# Patient Record
Sex: FEMALE | Race: OTHER | NOT HISPANIC OR LATINO | URBAN - METROPOLITAN AREA
[De-identification: names, ages, dates, MRNs, and addresses within clinical notes are randomized per-mention and may not be internally consistent; named-entity substitution may affect disease eponyms.]

---

## 2020-02-27 ENCOUNTER — EMERGENCY (EMERGENCY)
Facility: HOSPITAL | Age: 75
LOS: 1 days | Discharge: HOME | End: 2020-02-27
Admitting: EMERGENCY MEDICINE
Payer: COMMERCIAL

## 2020-02-27 VITALS
OXYGEN SATURATION: 100 % | RESPIRATION RATE: 17 BRPM | DIASTOLIC BLOOD PRESSURE: 79 MMHG | SYSTOLIC BLOOD PRESSURE: 147 MMHG | HEART RATE: 76 BPM | WEIGHT: 164.91 LBS | TEMPERATURE: 98 F | HEIGHT: 61 IN

## 2020-02-27 PROCEDURE — 99284 EMERGENCY DEPT VISIT MOD MDM: CPT

## 2020-02-27 PROCEDURE — 73030 X-RAY EXAM OF SHOULDER: CPT | Mod: 26,RT

## 2020-02-27 PROCEDURE — 73030 X-RAY EXAM OF SHOULDER: CPT

## 2020-02-27 RX ORDER — TRAMADOL HYDROCHLORIDE 50 MG/1
1 TABLET ORAL
Qty: 10 | Refills: 0
Start: 2020-02-27 | End: 2020-03-02

## 2020-02-27 RX ORDER — METHOCARBAMOL 500 MG/1
2 TABLET, FILM COATED ORAL
Qty: 28 | Refills: 0
Start: 2020-02-27 | End: 2020-03-04

## 2020-02-27 RX ORDER — METHOCARBAMOL 500 MG/1
750 TABLET, FILM COATED ORAL ONCE
Refills: 0 | Status: COMPLETED | OUTPATIENT
Start: 2020-02-27 | End: 2020-02-27

## 2020-02-27 RX ORDER — TRAMADOL HYDROCHLORIDE 50 MG/1
50 TABLET ORAL ONCE
Refills: 0 | Status: DISCONTINUED | OUTPATIENT
Start: 2020-02-27 | End: 2020-02-27

## 2020-02-27 RX ORDER — LIDOCAINE 4 G/100G
1 CREAM TOPICAL ONCE
Refills: 0 | Status: COMPLETED | OUTPATIENT
Start: 2020-02-27 | End: 2020-02-27

## 2020-02-27 RX ORDER — IBUPROFEN 200 MG
1 TABLET ORAL
Qty: 21 | Refills: 0
Start: 2020-02-27 | End: 2020-03-04

## 2020-02-27 RX ADMIN — METHOCARBAMOL 750 MILLIGRAM(S): 500 TABLET, FILM COATED ORAL at 12:19

## 2020-02-27 RX ADMIN — TRAMADOL HYDROCHLORIDE 50 MILLIGRAM(S): 50 TABLET ORAL at 12:20

## 2020-02-27 RX ADMIN — LIDOCAINE 1 PATCH: 4 CREAM TOPICAL at 12:19

## 2020-02-27 NOTE — ED PROVIDER NOTE - OBJECTIVE STATEMENT
73 yo female complaining of right shoulder pain since yesterday.  Patient states, "I was at work, I pulled a heavy bag of linen and my shoulder hurts."  Patient denies any CP, SOB, dizziness, numbness, tingling to her right arm, has no  other complaints at this time.

## 2020-02-27 NOTE — ED PROVIDER NOTE - NSFOLLOWUPINSTRUCTIONS_ED_ALL_ED_FT
I have discussed the discharge plan with the patient. The patient agrees with the plan, as discussed.  The patient understands Emergency Department diagnosis is a preliminary diagnosis often based on limited information and that the patient must adhere to the follow-up plan as discussed.  The patient understands that if the symptoms worsen or if prescribed medications do not have the desired/planned effect that the patient may return to the Emergency Department at any time for further evaluation and treatment.            SHOULDER PAIN - Ambulatory Care     Shoulder Pain    AMBULATORY CARE:    Shoulder pain is a common problem that can affect your daily activities. Pain can be caused by a problem within your shoulder, such as soreness of a tendon or bursa. A tendon is a cord of tough tissue that connects your muscles to your bones. The bursa is a fluid-filled sac that acts as a cushion between a bone and a tendon. Shoulder pain may also be caused by pain that spreads to your shoulder from another part of your body.Shoulder Anatomy         Seek care immediately if:     You have severe pain.      You cannot move your arm or shoulder.      You have numbness or tingling in your shoulder or arm.    Contact your healthcare provider if:     Your pain gets worse or does not go away with treatment.      You have trouble moving your arm or shoulder.      You have questions or concerns about your condition or care.    Treatment for shoulder pain may include any of the following:     Acetaminophen decreases pain and fever. It is available without a doctor's order. Ask how much to take and how often to take it. Follow directions. Read the labels of all other medicines you are using to see if they also contain acetaminophen, or ask your doctor or pharmacist. Acetaminophen can cause liver damage if not taken correctly. Do not use more than 4 grams (4,000 milligrams) total of acetaminophen in one day.       NSAIDs, such as ibuprofen, help decrease swelling, pain, and fever. This medicine is available with or without a doctor's order. NSAIDs can cause stomach bleeding or kidney problems in certain people. If you take blood thinner medicine, always ask your healthcare provider if NSAIDs are safe for you. Always read the medicine label and follow directions.      A steroid injection may help decrease pain and swelling.      Surgery may be needed for long-term pain and loss of function.    Manage your symptoms:     Apply ice on your shoulder for 20 to 30 minutes every 2 hours or as directed. Use an ice pack, or put crushed ice in a plastic bag. Cover it with a towel before you apply it to your shoulder. Ice helps prevent tissue damage and decreases swelling and pain.      Apply heat if ice does not help your symptoms. Apply heat on your shoulder for 20 to 30 minutes every 2 hours for as many days as directed. Heat helps decrease pain and muscle spasms.      Limit activities as directed. Try to avoid repeated overhead movements.      Go to physical or occupational therapy as directed. A physical therapist teaches you exercises to help improve movement and strength, and to decrease pain. An occupational therapist teaches you skills to help with your daily activities.     Prevent shoulder pain:     Maintain a good range of motion in your shoulder. Ask your healthcare provider which exercises you should do on a regular basis after you have healed.       Stretch and strengthen your shoulder. Use proper technique during exercises and sports.    Follow up with your healthcare provider or orthopedist as directed: Write down your questions so you remember to ask them during your visits.

## 2020-02-27 NOTE — ED PROVIDER NOTE - CLINICAL SUMMARY MEDICAL DECISION MAKING FREE TEXT BOX
73 yo female in the ER due to right shoulder pain since yesterday. Pt pulled heavy object at work . On exam-decreased ROM due to generalized pain to shoulder region. no deformity, no neurovascular compromise to right UE, no fx/dislocation on Xray. suspect strain/sprain. plan: rest, pain control, ortho f/u if no improvement in a few days.

## 2020-02-27 NOTE — ED PROVIDER NOTE - PATIENT PORTAL LINK FT
You can access the FollowMyHealth Patient Portal offered by [facility] by registering at the following website: [email or web address]. By joining [facility]’s FollowMyHealth portal, you will also be able to view your health information using other applications (apps) compatible with our system.

## 2020-02-27 NOTE — ED ADULT NURSE NOTE - CHPI ED NUR SYMPTOMS NEG
"Subjective:       Patient ID: Judy De La Torre is a 25 y.o. female.    Vitals:    01/21/19 1222   BP: (!) 101/58   Pulse: 71   Resp: 16   Temp: 98.3 °F (36.8 °C)   TempSrc: Oral   SpO2: 97%   Weight: 49.9 kg (110 lb)   Height: 5' 2" (1.575 m)       Chief Complaint: URI    Pt states she's had a sinus infection since before [place] that keeps coming and going. Pt c/o congestion, post nasal drip, sneezing, and sometimes a cough.  Pt has tried mucinex and benadryl.  Here with mom.  Requesting Diflucan if she needs antibiotic, no current symptoms of yeast infection.      URI    This is a new problem. The current episode started 1 to 4 weeks ago. The problem has been waxing and waning. There has been no fever. The fever has been present for less than 1 day. Associated symptoms include congestion, coughing, headaches, rhinorrhea, sinus pain and sneezing. Pertinent negatives include no abdominal pain, chest pain, diarrhea, dysuria, ear pain, joint pain, joint swelling, nausea, neck pain, plugged ear sensation, rash, sore throat, swollen glands, vomiting or wheezing. She has tried decongestant for the symptoms. The treatment provided no relief.     Review of Systems   Constitution: Negative for chills, fever and malaise/fatigue.   HENT: Positive for congestion, rhinorrhea, sinus pain and sneezing. Negative for ear pain, hoarse voice and sore throat.    Eyes: Negative for discharge and redness.   Cardiovascular: Negative for chest pain, dyspnea on exertion and leg swelling.   Respiratory: Positive for cough. Negative for shortness of breath, sputum production and wheezing.    Skin: Negative for rash.   Musculoskeletal: Negative for joint pain, myalgias and neck pain.   Gastrointestinal: Negative for abdominal pain, diarrhea, nausea and vomiting.   Genitourinary: Negative for dysuria.   Neurological: Positive for headaches.       Objective:      Physical Exam   Constitutional: She is oriented to person, place, and " time. She appears well-developed and well-nourished. She is cooperative.  Non-toxic appearance. She does not have a sickly appearance. She does not appear ill. No distress.   HENT:   Head: Normocephalic and atraumatic.   Right Ear: Hearing, external ear and ear canal normal. A middle ear effusion is present.   Left Ear: Hearing, external ear and ear canal normal. A middle ear effusion is present.   Nose: Mucosal edema and rhinorrhea (purulent) present. No nasal deformity. No epistaxis. Right sinus exhibits frontal sinus tenderness. Right sinus exhibits no maxillary sinus tenderness. Left sinus exhibits frontal sinus tenderness. Left sinus exhibits no maxillary sinus tenderness.   Mouth/Throat: Uvula is midline, oropharynx is clear and moist and mucous membranes are normal. No trismus in the jaw. Normal dentition. No uvula swelling. No oropharyngeal exudate, posterior oropharyngeal edema or posterior oropharyngeal erythema.   Bilateral serous fluid   Eyes: Conjunctivae and lids are normal. No scleral icterus.   Sclera clear bilat   Neck: Trachea normal, full passive range of motion without pain and phonation normal. Neck supple.   Cardiovascular: Normal rate, regular rhythm, normal heart sounds, intact distal pulses and normal pulses.   Pulmonary/Chest: Effort normal and breath sounds normal. No respiratory distress. She has no wheezes.   Abdominal: Soft. Normal appearance and bowel sounds are normal. She exhibits no distension. There is no tenderness.   Musculoskeletal: Normal range of motion. She exhibits no edema or deformity.   Lymphadenopathy:     She has no cervical adenopathy.   Neurological: She is alert and oriented to person, place, and time. She exhibits normal muscle tone. Coordination normal.   Skin: Skin is warm, dry and intact. She is not diaphoretic. No pallor.   Psychiatric: She has a normal mood and affect. Her speech is normal and behavior is normal. Judgment and thought content normal. Cognition  and memory are normal.   Nursing note and vitals reviewed.      Assessment:       1. Acute recurrent frontal sinusitis        Plan:       Judy was seen today for uri.    Diagnoses and all orders for this visit:    Acute recurrent frontal sinusitis  -     fluticasone (FLONASE) 50 mcg/actuation nasal spray; 2 sprays (100 mcg total) by Each Nare route once daily.  -     amoxicillin-clavulanate 875-125mg (AUGMENTIN) 875-125 mg per tablet; Take 1 tablet by mouth 2 (two) times daily. for 10 days  -     betamethasone acetate-betamethasone sodium phosphate injection 6 mg  -     fluconazole (DIFLUCAN) 150 MG Tab; Take 1 tablet (150 mg total) by mouth once. May repeat in 1 week if not improved for 1 dose      Patient Instructions   Please drink plenty of fluids.  Please get plenty of rest.  Please return here or go to the Emergency Department for any concerns or worsening of condition.  If you were prescribed antibiotics, please take them to completion.  Consider adding over-the-counter PROBIOTICS to decrease some side-effects associated with antibiotics. When a person takes antibiotics, both the harmful bacteria and the beneficial bacteria are killed. A reduction of beneficial bacteria can lead to digestive problems, such as diarrhea, yeast infections and urinary tract infections.  Diflucan as directed if needed.  If you do not have Hypertension or any history of palpitations, it is ok to take over the counter Sudafed or Mucinex D or Allegra-D or Claritin-D or Zyrtec-D.  If you do take one of the above, it is ok to combine that with plain over the counter Mucinex or Allegra or Claritin or Zyrtec.  If for example you are taking Zyrtec -D, you can combine that with Mucinex, but not Mucinex-D.  If you are taking Mucinex-D, you can combine that with plain Allegra or Claritin or Zyrtec.   If you do have Hypertension or palpitations, it is safe to take Coricidin HBP for relief of sinus symptoms.  We recommend you take over the  counter Flonase (Fluticasone) or another nasally inhaled steroid unless you are already taking one.  Nasal irrigation with a saline spray or Netti Pot like device per their directions is also recommended.  If not allergic, please take over the counter Tylenol (Acetaminophen) and/or Motrin (Ibuprofen) as directed for control of pain and/or fever.  Please follow up with your primary care doctor or specialist as needed.    If you  smoke, please stop smoking.  Sinusitis (Antibiotic Treatment)    The sinuses are air-filled spaces within the bones of the face. They connect to the inside of the nose. Sinusitis is an inflammation of the tissue lining the sinus cavity. Sinus inflammation can occur during a cold. It can also be due to allergies to pollens and other particles in the air. Sinusitis can cause symptoms of sinus congestion and fullness. A sinus infection causes fever, headache and facial pain. There is often green or yellow drainage from the nose or into the back of the throat (post-nasal drip). You have been given antibiotics to treat this condition.  Home care:  · Take the full course of antibiotics as instructed. Do not stop taking them, even if you feel better.  · Drink plenty of water, hot tea, and other liquids. This may help thin mucus. It also may promote sinus drainage.  · Heat may help soothe painful areas of the face. Use a towel soaked in hot water. Or,  the shower and direct the hot spray onto your face. Using a vaporizer along with a menthol rub at night may also help.   · An expectorant containing guaifenesin may help thin the mucus and promote drainage from the sinuses.  · Over-the-counter decongestants may be used unless a similar medicine was prescribed. Nasal sprays work the fastest. Use one that contains phenylephrine or oxymetazoline. First blow the nose gently. Then use the spray. Do not use these medicines more often than directed on the label or symptoms may get worse. You may also  use tablets containing pseudoephedrine. Avoid products that combine ingredients, because side effects may be increased. Read labels. You can also ask the pharmacist for help. (NOTE: Persons with high blood pressure should not use decongestants. They can raise blood pressure.)  · Over-the-counter antihistamines may help if allergies contributed to your sinusitis.    · Do not use nasal rinses or irrigation during an acute sinus infection, unless told to by your health care provider. Rinsing may spread the infection to other sinuses.  · Use acetaminophen or ibuprofen to control pain, unless another pain medicine was prescribed. (If you have chronic liver or kidney disease or ever had a stomach ulcer, talk with your doctor before using these medicines. Aspirin should never be used in anyone under 18 years of age who is ill with a fever. It may cause severe liver damage.)  · Don't smoke. This can worsen symptoms.  Follow-up care  Follow up with your healthcare provider or our staff if you are not improving within the next week.  When to seek medical advice  Call your healthcare provider if any of these occur:  · Facial pain or headache becoming more severe  · Stiff neck  · Unusual drowsiness or confusion  · Swelling of the forehead or eyelids  · Vision problems, including blurred or double vision  · Fever of 100.4ºF (38ºC) or higher, or as directed by your healthcare provider  · Seizure  · Breathing problems  · Symptoms not resolving within 10 days  Date Last Reviewed: 4/13/2015  © [id number] [employer]. [address] [place] [address], [place] [address]. All rights reserved. This information is not intended as a substitute for professional medical care. Always follow your healthcare professional's instructions.               no fever/no bruising/no deformity/no abrasion/no tingling

## 2020-02-27 NOTE — ED ADULT TRIAGE NOTE - CHIEF COMPLAINT QUOTE
Patient complaining of left shoulder pain since yesterday.  Patient states, "I was at work, I pulled a heavy bag of linen and my shoulder hurts."  Patient denies any CP, SOB, dizziness, numbness or any other complaints at this time.

## 2020-02-27 NOTE — ED PROVIDER NOTE - MUSCULOSKELETAL, MLM
Spine appears normal,  no vertebral point of tenderness to the cervical and thoracic pain, diffuse tenderness to the right shoulder joint, range of motion is limited due to pain, no deformity,

## 2020-02-27 NOTE — ED ADULT NURSE NOTE - NSIMPLEMENTINTERV_GEN_ALL_ED
Implemented All Universal Safety Interventions:  [place] to call system. Call bell, personal items and telephone within reach. Instruct patient to call for assistance. Room bathroom lighting operational. Non-slip footwear when patient is off stretcher. Physically safe environment: no spills, clutter or unnecessary equipment. Stretcher in lowest position, wheels locked, appropriate side rails in place.

## 2020-03-03 DIAGNOSIS — S46.911A STRAIN OF UNSPECIFIED MUSCLE, FASCIA AND TENDON AT SHOULDER AND UPPER ARM LEVEL, RIGHT ARM, INITIAL ENCOUNTER: ICD-10-CM
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
## 2020-03-03 DIAGNOSIS — M25.511 PAIN IN RIGHT SHOULDER: ICD-10-CM
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
## 2020-04-13 ENCOUNTER — INPATIENT (INPATIENT)
Facility: HOSPITAL | Age: 75
LOS: 1 days | Discharge: OTHER INSTITUTION | DRG: 871 | End: 2020-04-15
Attending: INTERNAL MEDICINE | Admitting: INTERNAL MEDICINE
Payer: COMMERCIAL

## 2020-04-13 VITALS
SYSTOLIC BLOOD PRESSURE: 124 MMHG | HEIGHT: 61 IN | RESPIRATION RATE: 20 BRPM | OXYGEN SATURATION: 92 % | WEIGHT: 158.07 LBS | TEMPERATURE: 102 F | DIASTOLIC BLOOD PRESSURE: 81 MMHG | HEART RATE: 111 BPM

## 2020-04-13 DIAGNOSIS — J96.01 ACUTE RESPIRATORY FAILURE WITH HYPOXIA: ICD-10-CM

## 2020-04-13 DIAGNOSIS — E78.5 HYPERLIPIDEMIA, UNSPECIFIED: ICD-10-CM

## 2020-04-13 DIAGNOSIS — R63.8 OTHER SYMPTOMS AND SIGNS CONCERNING FOOD AND FLUID INTAKE: ICD-10-CM

## 2020-04-13 DIAGNOSIS — A41.9 SEPSIS, UNSPECIFIED ORGANISM: ICD-10-CM

## 2020-04-13 DIAGNOSIS — I10 ESSENTIAL (PRIMARY) HYPERTENSION: ICD-10-CM

## 2020-04-13 DIAGNOSIS — B97.21 SARS-ASSOCIATED CORONAVIRUS AS THE CAUSE OF DISEASES CLASSIFIED ELSEWHERE: ICD-10-CM

## 2020-04-13 DIAGNOSIS — E11.9 TYPE 2 DIABETES MELLITUS WITHOUT COMPLICATIONS: ICD-10-CM

## 2020-04-13 LAB
ALBUMIN SERPL ELPH-MCNC: 3.9 G/DL — SIGNIFICANT CHANGE UP (ref 3.3–5)
ALP SERPL-CCNC: 109 U/L — SIGNIFICANT CHANGE UP (ref 40–120)
ALT FLD-CCNC: 24 U/L — SIGNIFICANT CHANGE UP (ref 10–45)
ANION GAP SERPL CALC-SCNC: 14 MMOL/L — SIGNIFICANT CHANGE UP (ref 5–17)
APTT BLD: 33.2 SEC — SIGNIFICANT CHANGE UP (ref 27.5–36.3)
AST SERPL-CCNC: 19 U/L — SIGNIFICANT CHANGE UP (ref 10–40)
BASE EXCESS BLDV CALC-SCNC: -2 MMOL/L — SIGNIFICANT CHANGE UP
BASOPHILS # BLD AUTO: 0 K/UL — SIGNIFICANT CHANGE UP (ref 0–0.2)
BASOPHILS NFR BLD AUTO: 0 % — SIGNIFICANT CHANGE UP (ref 0–2)
BILIRUB SERPL-MCNC: 0.4 MG/DL — SIGNIFICANT CHANGE UP (ref 0.2–1.2)
BUN SERPL-MCNC: 8 MG/DL — SIGNIFICANT CHANGE UP (ref 7–23)
CALCIUM SERPL-MCNC: 8.5 MG/DL — SIGNIFICANT CHANGE UP (ref 8.4–10.5)
CHLORIDE SERPL-SCNC: 102 MMOL/L — SIGNIFICANT CHANGE UP (ref 96–108)
CO2 SERPL-SCNC: 22 MMOL/L — SIGNIFICANT CHANGE UP (ref 22–31)
CREAT SERPL-MCNC: 0.52 MG/DL — SIGNIFICANT CHANGE UP (ref 0.5–1.3)
CRP SERPL-MCNC: 16.22 MG/DL — HIGH (ref 0–0.4)
D DIMER BLD IA.RAPID-MCNC: 166 NG/ML DDU — SIGNIFICANT CHANGE UP
EOSINOPHIL # BLD AUTO: 0 K/UL — SIGNIFICANT CHANGE UP (ref 0–0.5)
EOSINOPHIL NFR BLD AUTO: 0 % — SIGNIFICANT CHANGE UP (ref 0–6)
FERRITIN SERPL-MCNC: 369 NG/ML — HIGH (ref 15–150)
GLUCOSE BLDC GLUCOMTR-MCNC: 280 MG/DL — HIGH (ref 70–99)
GLUCOSE SERPL-MCNC: 235 MG/DL — HIGH (ref 70–99)
HCO3 BLDV-SCNC: 22 MMOL/L — SIGNIFICANT CHANGE UP (ref 20–27)
HCT VFR BLD CALC: 39.3 % — SIGNIFICANT CHANGE UP (ref 34.5–45)
HGB BLD-MCNC: 12.6 G/DL — SIGNIFICANT CHANGE UP (ref 11.5–15.5)
IMM GRANULOCYTES NFR BLD AUTO: 0.4 % — SIGNIFICANT CHANGE UP (ref 0–1.5)
INR BLD: 1.08 — SIGNIFICANT CHANGE UP (ref 0.88–1.16)
LACTATE SERPL-SCNC: 1.3 MMOL/L — SIGNIFICANT CHANGE UP (ref 0.5–2)
LDH SERPL L TO P-CCNC: 261 U/L — HIGH (ref 50–242)
LYMPHOCYTES # BLD AUTO: 0.95 K/UL — LOW (ref 1–3.3)
LYMPHOCYTES # BLD AUTO: 10.1 % — LOW (ref 13–44)
MAGNESIUM SERPL-MCNC: 1.5 MG/DL — LOW (ref 1.6–2.6)
MCHC RBC-ENTMCNC: 27.9 PG — SIGNIFICANT CHANGE UP (ref 27–34)
MCHC RBC-ENTMCNC: 32.1 GM/DL — SIGNIFICANT CHANGE UP (ref 32–36)
MCV RBC AUTO: 86.9 FL — SIGNIFICANT CHANGE UP (ref 80–100)
MONOCYTES # BLD AUTO: 0.42 K/UL — SIGNIFICANT CHANGE UP (ref 0–0.9)
MONOCYTES NFR BLD AUTO: 4.5 % — SIGNIFICANT CHANGE UP (ref 2–14)
NEUTROPHILS # BLD AUTO: 7.95 K/UL — HIGH (ref 1.8–7.4)
NEUTROPHILS NFR BLD AUTO: 85 % — HIGH (ref 43–77)
NRBC # BLD: 0 /100 WBCS — SIGNIFICANT CHANGE UP (ref 0–0)
NT-PROBNP SERPL-SCNC: 59 PG/ML — SIGNIFICANT CHANGE UP (ref 0–300)
PCO2 BLDV: 35 MMHG — LOW (ref 41–51)
PH BLDV: 7.41 — SIGNIFICANT CHANGE UP (ref 7.32–7.43)
PHOSPHATE SERPL-MCNC: 2.8 MG/DL — SIGNIFICANT CHANGE UP (ref 2.5–4.5)
PLATELET # BLD AUTO: 299 K/UL — SIGNIFICANT CHANGE UP (ref 150–400)
PO2 BLDV: 37 MMHG — SIGNIFICANT CHANGE UP
POTASSIUM SERPL-MCNC: 3.6 MMOL/L — SIGNIFICANT CHANGE UP (ref 3.5–5.3)
POTASSIUM SERPL-SCNC: 3.6 MMOL/L — SIGNIFICANT CHANGE UP (ref 3.5–5.3)
PROCALCITONIN SERPL-MCNC: 0.05 NG/ML — SIGNIFICANT CHANGE UP (ref 0.02–0.1)
PROT SERPL-MCNC: 7.8 G/DL — SIGNIFICANT CHANGE UP (ref 6–8.3)
PROTHROM AB SERPL-ACNC: 12.3 SEC — SIGNIFICANT CHANGE UP (ref 10–12.9)
RBC # BLD: 4.52 M/UL — SIGNIFICANT CHANGE UP (ref 3.8–5.2)
RBC # FLD: 13 % — SIGNIFICANT CHANGE UP (ref 10.3–14.5)
SAO2 % BLDV: 70 % — SIGNIFICANT CHANGE UP
SARS-COV-2 RNA SPEC QL NAA+PROBE: DETECTED
SODIUM SERPL-SCNC: 138 MMOL/L — SIGNIFICANT CHANGE UP (ref 135–145)
WBC # BLD: 9.36 K/UL — SIGNIFICANT CHANGE UP (ref 3.8–10.5)
WBC # FLD AUTO: 9.36 K/UL — SIGNIFICANT CHANGE UP (ref 3.8–10.5)

## 2020-04-13 PROCEDURE — 99222 1ST HOSP IP/OBS MODERATE 55: CPT

## 2020-04-13 PROCEDURE — 93010 ELECTROCARDIOGRAM REPORT: CPT

## 2020-04-13 PROCEDURE — 71045 X-RAY EXAM CHEST 1 VIEW: CPT | Mod: 26

## 2020-04-13 PROCEDURE — 99285 EMERGENCY DEPT VISIT HI MDM: CPT

## 2020-04-13 RX ORDER — AZITHROMYCIN 500 MG/1
500 TABLET, FILM COATED ORAL ONCE
Refills: 0 | Status: COMPLETED | OUTPATIENT
Start: 2020-04-13 | End: 2020-04-13

## 2020-04-13 RX ORDER — SODIUM CHLORIDE 9 MG/ML
1000 INJECTION, SOLUTION INTRAVENOUS
Refills: 0 | Status: DISCONTINUED | OUTPATIENT
Start: 2020-04-13 | End: 2020-04-15

## 2020-04-13 RX ORDER — ALBUTEROL 90 UG/1
1 AEROSOL, METERED ORAL EVERY 4 HOURS
Refills: 0 | Status: COMPLETED | OUTPATIENT
Start: 2020-04-13 | End: 2021-03-12

## 2020-04-13 RX ORDER — POTASSIUM CHLORIDE 20 MEQ
40 PACKET (EA) ORAL ONCE
Refills: 0 | Status: COMPLETED | OUTPATIENT
Start: 2020-04-13 | End: 2020-04-13

## 2020-04-13 RX ORDER — HYDROXYCHLOROQUINE SULFATE 200 MG
TABLET ORAL
Refills: 0 | Status: DISCONTINUED | OUTPATIENT
Start: 2020-04-13 | End: 2020-04-15

## 2020-04-13 RX ORDER — FAMOTIDINE 10 MG/ML
20 INJECTION INTRAVENOUS DAILY
Refills: 0 | Status: DISCONTINUED | OUTPATIENT
Start: 2020-04-13 | End: 2020-04-15

## 2020-04-13 RX ORDER — MAGNESIUM SULFATE 500 MG/ML
2 VIAL (ML) INJECTION ONCE
Refills: 0 | Status: COMPLETED | OUTPATIENT
Start: 2020-04-13 | End: 2020-04-13

## 2020-04-13 RX ORDER — ACETAMINOPHEN 500 MG
1000 TABLET ORAL ONCE
Refills: 0 | Status: COMPLETED | OUTPATIENT
Start: 2020-04-13 | End: 2020-04-13

## 2020-04-13 RX ORDER — ENOXAPARIN SODIUM 100 MG/ML
40 INJECTION SUBCUTANEOUS EVERY 24 HOURS
Refills: 0 | Status: DISCONTINUED | OUTPATIENT
Start: 2020-04-13 | End: 2020-04-13

## 2020-04-13 RX ORDER — DEXTROSE 50 % IN WATER 50 %
12.5 SYRINGE (ML) INTRAVENOUS ONCE
Refills: 0 | Status: DISCONTINUED | OUTPATIENT
Start: 2020-04-13 | End: 2020-04-15

## 2020-04-13 RX ORDER — ATORVASTATIN CALCIUM 80 MG/1
40 TABLET, FILM COATED ORAL AT BEDTIME
Refills: 0 | Status: DISCONTINUED | OUTPATIENT
Start: 2020-04-13 | End: 2020-04-15

## 2020-04-13 RX ORDER — HYDROXYCHLOROQUINE SULFATE 200 MG
800 TABLET ORAL EVERY 24 HOURS
Refills: 0 | Status: COMPLETED | OUTPATIENT
Start: 2020-04-13 | End: 2020-04-13

## 2020-04-13 RX ORDER — HYDROXYCHLOROQUINE SULFATE 200 MG
400 TABLET ORAL EVERY 24 HOURS
Refills: 0 | Status: DISCONTINUED | OUTPATIENT
Start: 2020-04-14 | End: 2020-04-15

## 2020-04-13 RX ORDER — ACETAMINOPHEN 500 MG
650 TABLET ORAL EVERY 6 HOURS
Refills: 0 | Status: DISCONTINUED | OUTPATIENT
Start: 2020-04-13 | End: 2020-04-15

## 2020-04-13 RX ORDER — DEXTROSE 50 % IN WATER 50 %
25 SYRINGE (ML) INTRAVENOUS ONCE
Refills: 0 | Status: DISCONTINUED | OUTPATIENT
Start: 2020-04-13 | End: 2020-04-15

## 2020-04-13 RX ORDER — ACETAMINOPHEN 500 MG
650 TABLET ORAL EVERY 4 HOURS
Refills: 0 | Status: DISCONTINUED | OUTPATIENT
Start: 2020-04-13 | End: 2020-04-13

## 2020-04-13 RX ORDER — GLUCAGON INJECTION, SOLUTION 0.5 MG/.1ML
1 INJECTION, SOLUTION SUBCUTANEOUS ONCE
Refills: 0 | Status: DISCONTINUED | OUTPATIENT
Start: 2020-04-13 | End: 2020-04-15

## 2020-04-13 RX ORDER — DEXTROSE 50 % IN WATER 50 %
15 SYRINGE (ML) INTRAVENOUS ONCE
Refills: 0 | Status: DISCONTINUED | OUTPATIENT
Start: 2020-04-13 | End: 2020-04-15

## 2020-04-13 RX ORDER — INSULIN LISPRO 100/ML
VIAL (ML) SUBCUTANEOUS
Refills: 0 | Status: DISCONTINUED | OUTPATIENT
Start: 2020-04-13 | End: 2020-04-15

## 2020-04-13 RX ORDER — ASPIRIN/CALCIUM CARB/MAGNESIUM 324 MG
81 TABLET ORAL DAILY
Refills: 0 | Status: DISCONTINUED | OUTPATIENT
Start: 2020-04-13 | End: 2020-04-15

## 2020-04-13 RX ORDER — ALBUTEROL 90 UG/1
1 AEROSOL, METERED ORAL ONCE
Refills: 0 | Status: COMPLETED | OUTPATIENT
Start: 2020-04-13 | End: 2020-04-13

## 2020-04-13 RX ORDER — FENOFIBRATE,MICRONIZED 130 MG
145 CAPSULE ORAL DAILY
Refills: 0 | Status: DISCONTINUED | OUTPATIENT
Start: 2020-04-13 | End: 2020-04-13

## 2020-04-13 RX ORDER — ENOXAPARIN SODIUM 100 MG/ML
40 INJECTION SUBCUTANEOUS EVERY 24 HOURS
Refills: 0 | Status: DISCONTINUED | OUTPATIENT
Start: 2020-04-13 | End: 2020-04-15

## 2020-04-13 RX ORDER — AZITHROMYCIN 500 MG/1
250 TABLET, FILM COATED ORAL DAILY
Refills: 0 | Status: DISCONTINUED | OUTPATIENT
Start: 2020-04-14 | End: 2020-04-15

## 2020-04-13 RX ADMIN — ENOXAPARIN SODIUM 40 MILLIGRAM(S): 100 INJECTION SUBCUTANEOUS at 19:20

## 2020-04-13 RX ADMIN — Medication 40 MILLIEQUIVALENT(S): at 23:08

## 2020-04-13 RX ADMIN — ENOXAPARIN SODIUM 40 MILLIGRAM(S): 100 INJECTION SUBCUTANEOUS at 23:09

## 2020-04-13 RX ADMIN — Medication 3: at 23:10

## 2020-04-13 RX ADMIN — ALBUTEROL 1 PUFF(S): 90 AEROSOL, METERED ORAL at 16:56

## 2020-04-13 RX ADMIN — AZITHROMYCIN 500 MILLIGRAM(S): 500 TABLET, FILM COATED ORAL at 16:12

## 2020-04-13 RX ADMIN — Medication 50 GRAM(S): at 23:08

## 2020-04-13 RX ADMIN — ATORVASTATIN CALCIUM 40 MILLIGRAM(S): 80 TABLET, FILM COATED ORAL at 23:09

## 2020-04-13 RX ADMIN — Medication 800 MILLIGRAM(S): at 23:08

## 2020-04-13 RX ADMIN — FAMOTIDINE 20 MILLIGRAM(S): 10 INJECTION INTRAVENOUS at 19:20

## 2020-04-13 RX ADMIN — Medication 650 MILLIGRAM(S): at 23:09

## 2020-04-13 RX ADMIN — Medication 1000 MILLIGRAM(S): at 16:05

## 2020-04-13 NOTE — H&P ADULT - PROBLEM SELECTOR PLAN 3
Pt w/ h/o DM II on Lantus 25 units QHS, Janumet 500/1000 PO BID and Repaglinide 2mg 1 tab TID  - MISS and Lantus 12 units QHS Pt tachycardic and febrile on admission  - F/u blood cultures Pt tachycardic and febrile on admission, sepsis likely 2/2 COVID infection  - F/u blood cultures

## 2020-04-13 NOTE — H&P ADULT - ASSESSMENT
75 yo F with PMHx of HTN, DM, HLD who presented to the [facility] ED on 4/13/2020 with reports of productive cough, SOB, chest tightness which is worse with ambulation x 4 days 75 yo F with PMHx of HTN, DM (on insulin), HLD who presented to the [facility] ED on 4/13/2020 with reports of non-productive cough, chest tightness, SOB which is worse with ambulation, myalgias and fever x 4 days; as well as watery, non bloody diarrhea x 3 days. Pt was found to be febrile, satting 92% on RA and 89% with ambulation, with positive COVID PCR, elevated inflammatory markers and CXR consistent with COVID -19. Pt was given Azithromycin 500mg PO x1, Tylenol and albuterol inhaler and is now admitted for further management of COVID infection. 73 yo F with PMHx of HTN, DM (on insulin), HLD who presented to the [facility] ED on 4/13/2020 with reports of non-productive cough, chest tightness, SOB which is worse with ambulation, myalgias and fever x 4 days; as well as watery, non bloody diarrhea x 3 days. Pt was found to be febrile to 101.8F, tachycardic mehreen 111, satting 92% on RA and 89% with ambulation, with positive COVID PCR, elevated inflammatory markers and CXR consistent with COVID -19. Pt was given Azithromycin 500mg PO x1, Tylenol and albuterol inhaler and is now admitted for further management of sepsis in the setting of COVID infection.

## 2020-04-13 NOTE — H&P ADULT - ATTENDING COMMENTS
74F w/ PMHx of HTN, HLD, IDDM, p/w of 3d of fever, non-productive cough, SOB and diarrhea found to have sepsis with acute hypoxic respiratory failure 2/2 COVID19  -Trend COVID19 labs and isolation precaution  -Continue nebs prn and azithro/plaquenil, hold for prolonged QTc  -F/u blood cx  -Wean O2 as tolerated  -Monitor glycemic control  -continue home asa and lipitor  -Resume fenofibrate  -Resume home antihypertensive in am  -GI ppx: pepcid  -DVT ppx: lmwh

## 2020-04-13 NOTE — H&P ADULT - HISTORY OF PRESENT ILLNESS
75 yo F with PMHx of HTN, DM, HLD who presented to the [facility] ED on 4/13/2020 with reports of productive cough, SOB, chest tightness which is worse with ambulation x 4 days. Pt denies CP, palpitations, abdominal pain, N/V/D, HA, dizziness, syncope.     Date of onset of fever:  Date of onset of dyspnea: 4 days  Recent Travel:  Sick Contacts:  COVID Exposure:  Close Contacts:    ED Course:  Vitals: BP: 111/87, T 100.6, , RR 18, O2 sat 98% on 3 L NC, 92% on RA   Labs: WBC 9.36, lymphocytes 0.95, K 3.6, D-dimer 166, Ferritin 369, CRP 16.62, Procalcitonin 0.05, , COVID + PCR   Imaging: CXR revealing bilateral patchy opacities suspicious for COVID-19.   EKG: Sinus tach @113, poor R wave progression, no STT changes, QTc 441  Interventions: Pt was given Azithromycin 500mg PO x1, Tylenol 1000mg PO x 1 and Albuterol inh     ROS:  Fevers: Y  Malaise: Y/N  Myalgias: Y/N  SOB: Y       At rest: Y/N         With exertion: Y       At baseline: Y/ N  Cough: Y       Productive: Y  Nausea: Y/N  Vomiting: Y/N  Diarrhea: Y/N    PMHx:   HTN: Y  DM: Y  Lung Disease: N       Specify:  Cardiovascular disease: Y/N  Malignancy: Y/N  Immunosuppression: Y/N  HIV: Y/N  CKD/ESRD: Y/N  Chronic Liver Disease: Y/N    SoHx:  Tobacco use: N  Vape use: N  Health care worker: N 75 yo F with PMHx of HTN, DM (on insulin), HLD who presented to the [facility] ED on 4/13/2020 with reports of non-productive cough, chest tightness, SOB which is worse with ambulation, myalgias and fever x 4 days; as well as watery, non bloody diarrhea x 3 days. Pt denies CP, palpitations, abdominal pain, N/V/D, HA, dizziness, syncope. Pt reports her  has also been sick with similar symptoms.     Date of onset of fever: 4 days  Date of onset of dyspnea: 4 days  Recent Travel: No  Sick Contacts:   COVID Exposure: None  Close Contacts: Daughter     ED Course:  Vitals: BP: 111/87, T 100.6, , RR 18, O2 sat 98% on 3 L NC, 92% on RA   Labs: WBC 9.36, lymphocytes 0.95, K 3.6, D-dimer 166, Ferritin 369, CRP 16.62, Procalcitonin 0.05, , COVID + PCR   Imaging: CXR revealing bilateral patchy opacities suspicious for COVID-19.   EKG: Sinus tach @113, poor R wave progression, no STT changes, QTc 441  Interventions: Pt was given Azithromycin 500mg PO x1, Tylenol 1000mg PO x 1 and Albuterol inh     ROS:  Fevers: Y  Malaise: Y  Myalgias: Y  SOB: Y       At rest: Y       With exertion: Y       At baseline: N  Cough: Y       Productive: N  Nausea: N  Vomiting: N  Diarrhea: Y    PMHx:   HTN: Y  DM: Y  Lung Disease: N       Specify:  Cardiovascular disease: N  Malignancy: N  Immunosuppression: N  HIV: N  CKD/ESRD: N  Chronic Liver Disease: N    SoHx:  Tobacco use: N  Vape use: N  Health care worker: N

## 2020-04-13 NOTE — H&P ADULT - PROBLEM SELECTOR PLAN 1
- Isolation precautions; contact and isolation  - covid-19 PCR positive 4/12  - Monitor O2 saturation, monitor for respiratory distress  - Nasal cannula as needed, avoid HFNC/BiPAP  - Tylenol 650mg for fever   - Started Hydroxychloroquine 400mg PO BID x 2 doses, then 200mg PO BID for 4 days  - Started azithromycin 250mg PO Daily x 5 days    - F/u D-Dimer,  Procalcitonin, Ferritin, ESR/CRP, LDH, CK, BNP, Troponin, Lactate if indicated  - F/u Quantiferon and G6PD Pt presents to the ED on 4/13/20 with cough, SOB, fever and diarrhea x 3-4 days.   - covid-19 PCR positive 4/12  - Started Hydroxychloroquine 400mg PO BID x 2 doses, then 200mg PO BID for 4 days (4/13)  - Pt given Azithromycin 500mg PO x1 in the ED, continue azithromycin 250mg PO Daily x 4 days (4/13)   - QTc 441 on 4/13 , f/u repeat on 4/15   - D-dimer 166, Ferritin 369, CRP 16.22, Procalcitonin 0.05,   - Continue to trend inflammatory markers   - F/u Quantiferon and G6PD  - F/u blood cultures   - Tylenol 650mg for fever   - Monitor O2 saturation, monitor for respiratory distress  - Nasal cannula as needed, avoid HFNC/BiPAP  - Isolation precautions; contact and isolation Pt presents to the ED on 4/13/20 with cough, SOB, fever and diarrhea x 3-4 days.   - covid-19 PCR positive 4/12  - Started Hydroxychloroquine 400mg PO BID x 2 doses, then 200mg PO BID for 4 days (4/13)  - Pt given Azithromycin 500mg PO x1 in the ED, continue azithromycin 250mg PO Daily x 4 days (4/13)   - QTc 441 on 4/13 , f/u daily ECG's   - D-dimer 166, Ferritin 369, CRP 16.22, Procalcitonin 0.05,   - Continue to trend inflammatory markers   - F/u Quantiferon and G6PD  - F/u blood cultures   - Tylenol 650mg for fever   - Monitor O2 saturation, monitor for respiratory distress  - Nasal cannula as needed, avoid HFNC/BiPAP  - Isolation precautions; contact and isolation Pt presents to the ED on 4/13/20 with cough, SOB, fever and diarrhea x 3-4 days.   - covid-19 PCR positive 4/12  - Started Hydroxychloroquine 400mg PO BID x 2 doses, then 200mg PO BID for 4 days (4/13)  - Pt given Azithromycin 500mg PO x1 in the ED, continue azithromycin 250mg PO Daily x 4 days (4/13)   - QTc 441 on 4/13 , f/u daily ECG's   - continue Famotidine 20mg PO BID   - D-dimer 166, Ferritin 369, CRP 16.22, Procalcitonin 0.05,   - Continue to trend inflammatory markers   - F/u Quantiferon and G6PD  - F/u blood cultures   - Tylenol 650mg for fever   - Monitor O2 saturation, monitor for respiratory distress  - Nasal cannula as needed, avoid HFNC/BiPAP  - Isolation precautions; contact and isolation

## 2020-04-13 NOTE — H&P ADULT - NSHPLABSRESULTS_GEN_ALL_CORE
12.6   9.36  )-----------( 299      ( 13 Apr 2020 15:13 )             39.3       04-13    138  |  102  |  8   ----------------------------<  235<H>  3.6   |  22  |  0.52    Ca    8.5      13 Apr 2020 15:13    TPro  7.8  /  Alb  3.9  /  TBili  0.4  /  DBili  x   /  AST  19  /  ALT  24  /  AlkPhos  109  04-13      PT/INR - ( 13 Apr 2020 15:13 )   PT: 12.3 sec;   INR: 1.08          PTT - ( 13 Apr 2020 15:13 )  PTT:33.2 sec              EKG: 12.6   9.36  )-----------( 299      ( 13 Apr 2020 15:13 )             39.3       04-13    138  |  102  |  8   ----------------------------<  235<H>  3.6   |  22  |  0.52    Ca    8.5      13 Apr 2020 15:13    TPro  7.8  /  Alb  3.9  /  TBili  0.4  /  DBili  x   /  AST  19  /  ALT  24  /  AlkPhos  109  04-13      PT/INR - ( 13 Apr 2020 15:13 )   PT: 12.3 sec;   INR: 1.08          PTT - ( 13 Apr 2020 15:13 )  PTT:33.2 sec

## 2020-04-13 NOTE — ED PROVIDER NOTE - CLINICAL SUMMARY MEDICAL DECISION MAKING FREE TEXT BOX
Probable covid pneumonia, labs, cxr, inhaler prn Probable covid pneumonia, labs, cxr, inhaler prn- cxr mild BL pneumonia- amb pulse ox drops to 89- px feels very sob and chest tightness- will admit for O2 therapy-covid mgmt

## 2020-04-13 NOTE — H&P ADULT - PROBLEM SELECTOR PLAN 7
F: None  E: Replete PRN  N: DASH diet  GI PPX: Famotidine 20mg BID   DVT PPX: Lovenox  Code: Full  Dispo: Pending clinical course and oxygen F: None  E: Replete PRN  N: DASH diet  GI PPX: Famotidine 20mg BID   DVT PPX: Lovenox  Code: Full  Dispo: Pending clinical progression F: None  E: Replete PRN  N: DASH diet  GI PPX: Famotidine 20mg QD  DVT PPX: Lovenox  Code: Full  Dispo: Pending clinical progression

## 2020-04-13 NOTE — ED PROVIDER NOTE - OBJECTIVE STATEMENT
74 F w hx of DM, HTN, high lipids- w cough, sob, chest tightness sputum for 4 days- moderate severity  sx worse with walking- yosvany po but less than normal  no known covid contacts  moderate severity  exac- walking  no hx of lung dz/smoking

## 2020-04-13 NOTE — ED ADULT NURSE NOTE - OBJECTIVE STATEMENT
The pt is a 73 y/o female who came in to ED for evaluation of SOB and cough. Pt denies fever, chills.

## 2020-04-13 NOTE — H&P ADULT - PROBLEM SELECTOR PLAN 4
Pt w/ h/o HTN, SBP stable at 111/67  - Holding home Enalapril 5mg PO QD in setting of COVID  - Can give Amlodipine 5mg PO daily if needed Pt w/ h/o DM II on Lantus 25 units QHS, Janumet 500/1000 PO BID and Repaglinide 2mg 1 tab TID  - MISS and Lantus 12 units QHS

## 2020-04-13 NOTE — H&P ADULT - PROBLEM SELECTOR PLAN 6
F: None  E: Replete PRN  N:  GI PPX:  DVT PPX:  Code:   Dispo: F: None  E: Replete PRN  N: DASH diet  GI PPX: Famotidine 20mg BID   DVT PPX: Lovenox  Code: Full  Dispo: Pending clinical course -Continue home Atorvastatin 40mg PO QD  - Hold home Fenofibrate 160mg in setting of COVID

## 2020-04-13 NOTE — H&P ADULT - PROBLEM SELECTOR PLAN 2
Pt saturating 92% on RA, 89% on ambulation, 98% on 3L NC on admission  - CXR on 4/13: bilateral patchy opacities suspicious for COVID-19.   - Continue to wean NC as tolerated

## 2020-04-13 NOTE — H&P ADULT - PROBLEM SELECTOR PLAN 5
F: None  E: Replete PRN  N:  GI PPX:  DVT PPX:  Code:   Dispo: -Continue home Atorvastatin 40mg PO QD Pt w/ h/o HTN, SBP stable at 111/67  - Holding home Enalapril 5mg PO QD in setting of COVID

## 2020-04-14 LAB
A1C WITH ESTIMATED AVERAGE GLUCOSE RESULT: 8.2 % — HIGH (ref 4–5.6)
ALBUMIN SERPL ELPH-MCNC: 3.6 G/DL — SIGNIFICANT CHANGE UP (ref 3.3–5)
ALP SERPL-CCNC: 105 U/L — SIGNIFICANT CHANGE UP (ref 40–120)
ALT FLD-CCNC: 33 U/L — SIGNIFICANT CHANGE UP (ref 10–45)
ANION GAP SERPL CALC-SCNC: 10 MMOL/L — SIGNIFICANT CHANGE UP (ref 5–17)
AST SERPL-CCNC: 26 U/L — SIGNIFICANT CHANGE UP (ref 10–40)
BILIRUB SERPL-MCNC: 0.5 MG/DL — SIGNIFICANT CHANGE UP (ref 0.2–1.2)
BUN SERPL-MCNC: 7 MG/DL — SIGNIFICANT CHANGE UP (ref 7–23)
CALCIUM SERPL-MCNC: 8.5 MG/DL — SIGNIFICANT CHANGE UP (ref 8.4–10.5)
CHLORIDE SERPL-SCNC: 106 MMOL/L — SIGNIFICANT CHANGE UP (ref 96–108)
CO2 SERPL-SCNC: 24 MMOL/L — SIGNIFICANT CHANGE UP (ref 22–31)
CREAT SERPL-MCNC: 0.48 MG/DL — LOW (ref 0.5–1.3)
CRP SERPL-MCNC: 19.33 MG/DL — HIGH (ref 0–0.4)
D DIMER BLD IA.RAPID-MCNC: <150 NG/ML DDU — SIGNIFICANT CHANGE UP
FERRITIN SERPL-MCNC: 412 NG/ML — HIGH (ref 15–150)
GLUCOSE BLDC GLUCOMTR-MCNC: 179 MG/DL — HIGH (ref 70–99)
GLUCOSE BLDC GLUCOMTR-MCNC: 215 MG/DL — HIGH (ref 70–99)
GLUCOSE BLDC GLUCOMTR-MCNC: 228 MG/DL — HIGH (ref 70–99)
GLUCOSE BLDC GLUCOMTR-MCNC: 237 MG/DL — HIGH (ref 70–99)
GLUCOSE SERPL-MCNC: 223 MG/DL — HIGH (ref 70–99)
HCT VFR BLD CALC: 37.9 % — SIGNIFICANT CHANGE UP (ref 34.5–45)
HCV AB S/CO SERPL IA: 0.12 S/CO — SIGNIFICANT CHANGE UP
HCV AB SERPL-IMP: SIGNIFICANT CHANGE UP
HGB BLD-MCNC: 12.3 G/DL — SIGNIFICANT CHANGE UP (ref 11.5–15.5)
MAGNESIUM SERPL-MCNC: 2.1 MG/DL — SIGNIFICANT CHANGE UP (ref 1.6–2.6)
MCHC RBC-ENTMCNC: 28.6 PG — SIGNIFICANT CHANGE UP (ref 27–34)
MCHC RBC-ENTMCNC: 32.5 GM/DL — SIGNIFICANT CHANGE UP (ref 32–36)
MCV RBC AUTO: 88.1 FL — SIGNIFICANT CHANGE UP (ref 80–100)
NRBC # BLD: 0 /100 WBCS — SIGNIFICANT CHANGE UP (ref 0–0)
PHOSPHATE SERPL-MCNC: 2.7 MG/DL — SIGNIFICANT CHANGE UP (ref 2.5–4.5)
PLATELET # BLD AUTO: 330 K/UL — SIGNIFICANT CHANGE UP (ref 150–400)
POTASSIUM SERPL-MCNC: 4.3 MMOL/L — SIGNIFICANT CHANGE UP (ref 3.5–5.3)
POTASSIUM SERPL-SCNC: 4.3 MMOL/L — SIGNIFICANT CHANGE UP (ref 3.5–5.3)
PROT SERPL-MCNC: 7.2 G/DL — SIGNIFICANT CHANGE UP (ref 6–8.3)
RBC # BLD: 4.3 M/UL — SIGNIFICANT CHANGE UP (ref 3.8–5.2)
RBC # FLD: 13.2 % — SIGNIFICANT CHANGE UP (ref 10.3–14.5)
SODIUM SERPL-SCNC: 140 MMOL/L — SIGNIFICANT CHANGE UP (ref 135–145)
WBC # BLD: 9.13 K/UL — SIGNIFICANT CHANGE UP (ref 3.8–10.5)
WBC # FLD AUTO: 9.13 K/UL — SIGNIFICANT CHANGE UP (ref 3.8–10.5)

## 2020-04-14 PROCEDURE — 99232 SBSQ HOSP IP/OBS MODERATE 35: CPT

## 2020-04-14 RX ADMIN — FAMOTIDINE 20 MILLIGRAM(S): 10 INJECTION INTRAVENOUS at 11:26

## 2020-04-14 RX ADMIN — ENOXAPARIN SODIUM 40 MILLIGRAM(S): 100 INJECTION SUBCUTANEOUS at 22:33

## 2020-04-14 RX ADMIN — Medication 400 MILLIGRAM(S): at 22:33

## 2020-04-14 RX ADMIN — Medication 2: at 17:56

## 2020-04-14 RX ADMIN — Medication 2: at 12:16

## 2020-04-14 RX ADMIN — Medication 2: at 09:41

## 2020-04-14 RX ADMIN — Medication 81 MILLIGRAM(S): at 11:26

## 2020-04-14 RX ADMIN — Medication 1: at 22:34

## 2020-04-14 RX ADMIN — AZITHROMYCIN 250 MILLIGRAM(S): 500 TABLET, FILM COATED ORAL at 11:26

## 2020-04-14 RX ADMIN — ATORVASTATIN CALCIUM 40 MILLIGRAM(S): 80 TABLET, FILM COATED ORAL at 22:33

## 2020-04-14 NOTE — PROGRESS NOTE ADULT - PROBLEM SELECTOR PLAN 3
Pt tachycardic and febrile on admission, sepsis likely 2/2 COVID infection  - F/u blood cultures Pt tachycardic and febrile on admission, sepsis likely 2/2 COVID infection  - F/u blood cultures: NGTD

## 2020-04-14 NOTE — PROGRESS NOTE ADULT - ASSESSMENT
73 yo F with PMHx of HTN, DM (on insulin), HLD who presented to the [facility] ED on 4/13/2020 with reports of non-productive cough, chest tightness, SOB which is worse with ambulation, myalgias and fever x 4 days; as well as watery, non bloody diarrhea x 3 days. Pt was found to be febrile to 101.8F, tachycardic mehreen 111, satting 92% on RA and 89% with ambulation, with positive COVID PCR, elevated inflammatory markers and CXR consistent with COVID -19. Pt was given Azithromycin 500mg PO x1, Tylenol and albuterol inhaler and is now admitted for further management of sepsis in the setting of COVID infection.

## 2020-04-14 NOTE — PROGRESS NOTE ADULT - PROBLEM SELECTOR PLAN 7
F: None  E: Replete PRN  N: DASH diet  GI PPX: Famotidine 20mg QD  DVT PPX: Lovenox  Code: Full  Dispo: Pending clinical progression

## 2020-04-14 NOTE — PROGRESS NOTE ADULT - PROBLEM SELECTOR PLAN 2
Pt saturating 92% on RA, 89% on ambulation, 98% on 3L NC on admission  - CXR on 4/13: bilateral patchy opacities suspicious for COVID-19.   - Continue to wean NC as tolerated Pt saturating 92% on RA, 89% on ambulation, 98% on 3L NC  - CXR on 4/13: bilateral patchy opacities suspicious for COVID-19.   - Continue to wean NC as tolerated

## 2020-04-14 NOTE — PROGRESS NOTE ADULT - SUBJECTIVE AND OBJECTIVE BOX
**INCOMPLETE**  INTERVAL HPI/OVERNIGHT EVENTS:  Patient was seen and examined at bedside. As per nurse and patient, no o/n events, patient resting comfortably. No complaints at this time. Patient denies: fever, chills, dizziness, weakness, HA, Changes in vision, CP, palpitations, SOB, cough, N/V/D/C, dysuria, changes in bowel movements, LE edema. ROS otherwise negative.    VITAL SIGNS:  T(F): 98.6 (04-14-20 @ 06:30)  HR: 94 (04-14-20 @ 06:30)  BP: 112/71 (04-14-20 @ 06:30)  RR: 18 (04-14-20 @ 06:30)  SpO2: 95% (04-14-20 @ 06:30)  Wt(kg): --        PHYSICAL EXAM:  Constitutional: resting comfortably in bed; NAD  HEENT: NC/AT, PERRL, EOMI; MMM  Respiratory: CTA B/L; no W/R/R, no retractions. No accessory muscle use.   Cardiac: +S1/S2; RRR; no M/R/G  Gastrointestinal: soft, NT/ND; no rebound or guarding.   Back: spine midline, no bony tenderness or step-offs  Extremities: WWP, no clubbing or cyanosis; no peripheral edema  Musculoskeletal: NROM x4; no joint swelling, tenderness or erythema  Dermatologic: skin warm, dry and intact; no rashes, wounds, or scars  Neurologic: AAOx3; CNII-XII grossly intact; no focal deficits    MEDICATIONS  (STANDING):  aspirin enteric coated 81 milliGRAM(s) Oral daily  atorvastatin 40 milliGRAM(s) Oral at bedtime  azithromycin   Tablet 250 milliGRAM(s) Oral daily  dextrose 5%. 1000 milliLiter(s) (50 mL/Hr) IV Continuous <Continuous>  dextrose 50% Injectable 12.5 Gram(s) IV Push once  dextrose 50% Injectable 25 Gram(s) IV Push once  dextrose 50% Injectable 25 Gram(s) IV Push once  enoxaparin Injectable 40 milliGRAM(s) SubCutaneous every 24 hours  famotidine    Tablet 20 milliGRAM(s) Oral daily  hydroxychloroquine   Oral   hydroxychloroquine 400 milliGRAM(s) Oral every 24 hours  insulin lispro (HumaLOG) corrective regimen sliding scale   SubCutaneous Before meals and at bedtime    MEDICATIONS  (PRN):  acetaminophen   Tablet .. 650 milliGRAM(s) Oral every 6 hours PRN Temp greater or equal to 38C (100.4F), Mild Pain (1 - 3)  dextrose 40% Gel 15 Gram(s) Oral once PRN Blood Glucose LESS THAN 70 milliGRAM(s)/deciliter  glucagon  Injectable 1 milliGRAM(s) IntraMuscular once PRN Glucose LESS THAN 70 milligrams/deciliter      Allergies    No Known Allergies    Intolerances        LABS:                        12.6   9.36  )-----------( 299      ( 13 Apr 2020 15:13 )             39.3     04-13    138  |  102  |  8   ----------------------------<  235<H>  3.6   |  22  |  0.52    Ca    8.5      13 Apr 2020 15:13  Phos  2.8     04-13  Mg     1.5     04-13    TPro  7.8  /  Alb  3.9  /  TBili  0.4  /  DBili  x   /  AST  19  /  ALT  24  /  AlkPhos  109  04-13    PT/INR - ( 13 Apr 2020 15:13 )   PT: 12.3 sec;   INR: 1.08          PTT - ( 13 Apr 2020 15:13 )  PTT:33.2 sec      RADIOLOGY & ADDITIONAL TESTS:  Reviewed INTERVAL HPI/OVERNIGHT EVENTS:  Patient was seen and examined at bedside. As per nurse and patient, no o/n events, patient resting comfortably. No complaints at this time. Patient denies: fever, chills, dizziness, weakness, SOB, cough. ROS otherwise negative.    VITAL SIGNS:  T(F): 98.6 (04-14-20 @ 06:30)  HR: 94 (04-14-20 @ 06:30)  BP: 112/71 (04-14-20 @ 06:30)  RR: 18 (04-14-20 @ 06:30)  SpO2: 95% (04-14-20 @ 06:30)  Wt(kg): --    PHYSICAL EXAM:  Constitutional: resting comfortably in bed; NAD  HEENT: NC/AT, PERRL, EOMI; MMM  Respiratory: CTA B/L; no W/R/R, no retractions. No accessory muscle use.   Cardiac: +S1/S2; RRR; no M/R/G  Gastrointestinal: soft, NT/ND; no rebound or guarding.   Extremities: WWP, no clubbing or cyanosis; no peripheral edema  Musculoskeletal: NROM x4; no joint swelling, tenderness or erythema  Dermatologic: skin warm, dry and intact; no rashes, wounds, or scars  Neurologic: AAOx3; CNII-XII grossly intact; no focal deficits    MEDICATIONS  (STANDING):  aspirin enteric coated 81 milliGRAM(s) Oral daily  atorvastatin 40 milliGRAM(s) Oral at bedtime  azithromycin   Tablet 250 milliGRAM(s) Oral daily  dextrose 5%. 1000 milliLiter(s) (50 mL/Hr) IV Continuous <Continuous>  dextrose 50% Injectable 12.5 Gram(s) IV Push once  dextrose 50% Injectable 25 Gram(s) IV Push once  dextrose 50% Injectable 25 Gram(s) IV Push once  enoxaparin Injectable 40 milliGRAM(s) SubCutaneous every 24 hours  famotidine    Tablet 20 milliGRAM(s) Oral daily  hydroxychloroquine   Oral   hydroxychloroquine 400 milliGRAM(s) Oral every 24 hours  insulin lispro (HumaLOG) corrective regimen sliding scale   SubCutaneous Before meals and at bedtime    MEDICATIONS  (PRN):  acetaminophen   Tablet .. 650 milliGRAM(s) Oral every 6 hours PRN Temp greater or equal to 38C (100.4F), Mild Pain (1 - 3)  dextrose 40% Gel 15 Gram(s) Oral once PRN Blood Glucose LESS THAN 70 milliGRAM(s)/deciliter  glucagon  Injectable 1 milliGRAM(s) IntraMuscular once PRN Glucose LESS THAN 70 milligrams/deciliter      Allergies    No Known Allergies    Intolerances        LABS:                        12.6   9.36  )-----------( 299      ( 13 Apr 2020 15:13 )             39.3     04-13    138  |  102  |  8   ----------------------------<  235<H>  3.6   |  22  |  0.52    Ca    8.5      13 Apr 2020 15:13  Phos  2.8     04-13  Mg     1.5     04-13    TPro  7.8  /  Alb  3.9  /  TBili  0.4  /  DBili  x   /  AST  19  /  ALT  24  /  AlkPhos  109  04-13    PT/INR - ( 13 Apr 2020 15:13 )   PT: 12.3 sec;   INR: 1.08          PTT - ( 13 Apr 2020 15:13 )  PTT:33.2 sec      RADIOLOGY & ADDITIONAL TESTS:  Reviewed

## 2020-04-14 NOTE — PROGRESS NOTE ADULT - PROBLEM SELECTOR PLAN 1
Pt presents to the ED on 4/13/20 with cough, SOB, fever and diarrhea x 3-4 days.   - covid-19 PCR positive 4/12  - Started Hydroxychloroquine 400mg PO BID x 2 doses, then 200mg PO BID for 4 days (4/13)  - Pt given Azithromycin 500mg PO x1 in the ED, continue azithromycin 250mg PO Daily x 4 days (4/13)   - QTc 441 on 4/13 , f/u daily ECG's   - continue Famotidine 20mg PO BID   - D-dimer 166, Ferritin 369, CRP 16.22, Procalcitonin 0.05,   - Continue to trend inflammatory markers   - F/u Quantiferon and G6PD  - F/u blood cultures   - Tylenol 650mg for fever   - Monitor O2 saturation, monitor for respiratory distress  - Nasal cannula as needed, avoid HFNC/BiPAP  - Isolation precautions; contact and isolation Pt presents to the ED on 4/13/20 with cough, SOB, fever and diarrhea x 3-4 days.   - covid-19 PCR positive 4/12  - Started Hydroxychloroquine 400mg PO BID x 2 doses, then 200mg PO BID for 4 days (4/13)  - Pt given Azithromycin 500mg PO x1 in the ED, continue azithromycin 250mg PO Daily x 4 days (4/13)   - QTc 441 on 4/13 , f/u daily ECG's   - continue Famotidine 20mg PO BID   - D-dimer <150 (166), Ferritin 412 (369), CRP 19 (16.22)  - Continue to trend inflammatory markers   - F/u blood cultures: NGTD   - Tylenol 650mg for fever   - Monitor O2 saturation, monitor for respiratory distress  - Nasal cannula as needed, avoid HFNC/BiPAP  - Isolation precautions; contact and isolation

## 2020-04-14 NOTE — PROGRESS NOTE ADULT - ATTENDING COMMENTS
Assessment on date 04-14-20 Patient personally seen and examined myself during rounds, face-to-face, with the NPP  Note read, including vitals, physical findings, laboratory data, and radiological reports.   Agree with above with revisions, if any included below.     - Chief Complaint: follow up for sob     - Laboratory data reviewed by me    -My exam:  Constitutional: resting comfortably in bed; NAD  HEENT: NC/AT, PERRL, EOMI; MMM  Respiratory: CTA B/L; no W/R/R, no retractions. No accessory muscle use.   Cardiac: +S1/S2; RRR; no M/R/G  Gastrointestinal: soft, NT/ND; no rebound or guarding.   Extremities: WWP, no clubbing or cyanosis; no peripheral edema  Musculoskeletal: NROM x4; no joint swelling, tenderness or erythema  Dermatologic: skin warm, dry and intact; no rashes, wounds, or scars  Neurologic: AAOx3; CNII-XII grossly intact; no focal deficits    - Diagnosis and Plan:  COVID: - Started Hydroxychloroquine 400mg PO BID x 2 doses, then 200mg PO BID for 4       -Plan discussed with  COVID Med Team regarding med mgmt of COVID infx  Social Work regarding safe discharge planning Assessment on date 04-14-20 Patient personally seen and examined myself during rounds, face-to-face, with the NPP  Note read, including vitals, physical findings, laboratory data, and radiological reports.   Agree with above with revisions, if any included below.     - Chief Complaint: follow up for sob     - Laboratory data reviewed by me    -My exam:  Constitutional: resting comfortably in bed; NAD  HEENT: NC/AT, PERRL, EOMI; MMM  Respiratory: CTA B/L; no W/R/R, no retractions. No accessory muscle use.   Cardiac: +S1/S2; RRR; no M/R/G  Gastrointestinal: soft, NT/ND; no rebound or guarding.   Extremities: WWP, no clubbing or cyanosis; no peripheral edema  Musculoskeletal: NROM x4; no joint swelling, tenderness or erythema  Dermatologic: skin warm, dry and intact; no rashes, wounds, or scars  Neurologic: AAOx3; CNII-XII grossly intact; no focal deficits    - Diagnosis and Plan:  COVID: - Started Hydroxychloroquine 400mg PO BID x 2 doses, then 200mg PO BID for 4   SOB: - Continue to wean NC as tolerated    -Plan discussed with  COVID Med Team regarding med mgmt of COVID infx  Social Work regarding safe discharge planning

## 2020-04-14 NOTE — PROGRESS NOTE ADULT - PROBLEM SELECTOR PLAN 4
Pt w/ h/o DM II on Lantus 25 units QHS, Janumet 500/1000 PO BID and Repaglinide 2mg 1 tab TID  - MISS and Lantus 12 units QHS

## 2020-04-15 ENCOUNTER — TRANSCRIPTION ENCOUNTER (OUTPATIENT)
Age: 75
End: 2020-04-15

## 2020-04-15 VITALS
SYSTOLIC BLOOD PRESSURE: 110 MMHG | HEART RATE: 87 BPM | RESPIRATION RATE: 20 BRPM | OXYGEN SATURATION: 93 % | DIASTOLIC BLOOD PRESSURE: 72 MMHG | TEMPERATURE: 98 F

## 2020-04-15 PROBLEM — Z00.00 ENCOUNTER FOR PREVENTIVE HEALTH EXAMINATION: Status: ACTIVE | Noted: 2020-04-15

## 2020-04-15 LAB
ALBUMIN SERPL ELPH-MCNC: 3.4 G/DL — SIGNIFICANT CHANGE UP (ref 3.3–5)
ALP SERPL-CCNC: 106 U/L — SIGNIFICANT CHANGE UP (ref 40–120)
ALT FLD-CCNC: 49 U/L — HIGH (ref 10–45)
ANION GAP SERPL CALC-SCNC: 12 MMOL/L — SIGNIFICANT CHANGE UP (ref 5–17)
AST SERPL-CCNC: 32 U/L — SIGNIFICANT CHANGE UP (ref 10–40)
BASOPHILS # BLD AUTO: 0.01 K/UL — SIGNIFICANT CHANGE UP (ref 0–0.2)
BASOPHILS NFR BLD AUTO: 0.1 % — SIGNIFICANT CHANGE UP (ref 0–2)
BILIRUB SERPL-MCNC: 0.4 MG/DL — SIGNIFICANT CHANGE UP (ref 0.2–1.2)
BUN SERPL-MCNC: 6 MG/DL — LOW (ref 7–23)
CALCIUM SERPL-MCNC: 8.2 MG/DL — LOW (ref 8.4–10.5)
CHLORIDE SERPL-SCNC: 104 MMOL/L — SIGNIFICANT CHANGE UP (ref 96–108)
CO2 SERPL-SCNC: 23 MMOL/L — SIGNIFICANT CHANGE UP (ref 22–31)
CREAT SERPL-MCNC: 0.5 MG/DL — SIGNIFICANT CHANGE UP (ref 0.5–1.3)
CRP SERPL-MCNC: 15.12 MG/DL — HIGH (ref 0–0.4)
D DIMER BLD IA.RAPID-MCNC: <150 NG/ML DDU — SIGNIFICANT CHANGE UP
EOSINOPHIL # BLD AUTO: 0.03 K/UL — SIGNIFICANT CHANGE UP (ref 0–0.5)
EOSINOPHIL NFR BLD AUTO: 0.4 % — SIGNIFICANT CHANGE UP (ref 0–6)
FERRITIN SERPL-MCNC: 440 NG/ML — HIGH (ref 15–150)
GAMMA INTERFERON BACKGROUND BLD IA-ACNC: 0.1 IU/ML — SIGNIFICANT CHANGE UP
GLUCOSE BLDC GLUCOMTR-MCNC: 198 MG/DL — HIGH (ref 70–99)
GLUCOSE BLDC GLUCOMTR-MCNC: 207 MG/DL — HIGH (ref 70–99)
GLUCOSE SERPL-MCNC: 181 MG/DL — HIGH (ref 70–99)
HCT VFR BLD CALC: 35 % — SIGNIFICANT CHANGE UP (ref 34.5–45)
HGB BLD-MCNC: 11.1 G/DL — LOW (ref 11.5–15.5)
IMM GRANULOCYTES NFR BLD AUTO: 0.7 % — SIGNIFICANT CHANGE UP (ref 0–1.5)
LYMPHOCYTES # BLD AUTO: 1.46 K/UL — SIGNIFICANT CHANGE UP (ref 1–3.3)
LYMPHOCYTES # BLD AUTO: 20 % — SIGNIFICANT CHANGE UP (ref 13–44)
M TB IFN-G BLD-IMP: NEGATIVE — SIGNIFICANT CHANGE UP
M TB IFN-G CD4+ BCKGRND COR BLD-ACNC: 0.02 IU/ML — SIGNIFICANT CHANGE UP
M TB IFN-G CD4+CD8+ BCKGRND COR BLD-ACNC: 0.03 IU/ML — SIGNIFICANT CHANGE UP
MAGNESIUM SERPL-MCNC: 2 MG/DL — SIGNIFICANT CHANGE UP (ref 1.6–2.6)
MCHC RBC-ENTMCNC: 27.8 PG — SIGNIFICANT CHANGE UP (ref 27–34)
MCHC RBC-ENTMCNC: 31.7 GM/DL — LOW (ref 32–36)
MCV RBC AUTO: 87.7 FL — SIGNIFICANT CHANGE UP (ref 80–100)
MONOCYTES # BLD AUTO: 0.5 K/UL — SIGNIFICANT CHANGE UP (ref 0–0.9)
MONOCYTES NFR BLD AUTO: 6.8 % — SIGNIFICANT CHANGE UP (ref 2–14)
NEUTROPHILS # BLD AUTO: 5.26 K/UL — SIGNIFICANT CHANGE UP (ref 1.8–7.4)
NEUTROPHILS NFR BLD AUTO: 72 % — SIGNIFICANT CHANGE UP (ref 43–77)
NRBC # BLD: 0 /100 WBCS — SIGNIFICANT CHANGE UP (ref 0–0)
PHOSPHATE SERPL-MCNC: 2.9 MG/DL — SIGNIFICANT CHANGE UP (ref 2.5–4.5)
PLATELET # BLD AUTO: 343 K/UL — SIGNIFICANT CHANGE UP (ref 150–400)
POTASSIUM SERPL-MCNC: 4 MMOL/L — SIGNIFICANT CHANGE UP (ref 3.5–5.3)
POTASSIUM SERPL-SCNC: 4 MMOL/L — SIGNIFICANT CHANGE UP (ref 3.5–5.3)
PROT SERPL-MCNC: 6.8 G/DL — SIGNIFICANT CHANGE UP (ref 6–8.3)
QUANT TB PLUS MITOGEN MINUS NIL: 6.6 IU/ML — SIGNIFICANT CHANGE UP
RBC # BLD: 3.99 M/UL — SIGNIFICANT CHANGE UP (ref 3.8–5.2)
RBC # FLD: 12.9 % — SIGNIFICANT CHANGE UP (ref 10.3–14.5)
SODIUM SERPL-SCNC: 139 MMOL/L — SIGNIFICANT CHANGE UP (ref 135–145)
WBC # BLD: 7.15 K/UL — SIGNIFICANT CHANGE UP (ref 3.8–10.5)
WBC # FLD AUTO: 7.15 K/UL — SIGNIFICANT CHANGE UP (ref 3.8–10.5)

## 2020-04-15 PROCEDURE — 87040 BLOOD CULTURE FOR BACTERIA: CPT

## 2020-04-15 PROCEDURE — 85610 PROTHROMBIN TIME: CPT

## 2020-04-15 PROCEDURE — 82728 ASSAY OF FERRITIN: CPT

## 2020-04-15 PROCEDURE — 86140 C-REACTIVE PROTEIN: CPT

## 2020-04-15 PROCEDURE — 87635 SARS-COV-2 COVID-19 AMP PRB: CPT

## 2020-04-15 PROCEDURE — 83615 LACTATE (LD) (LDH) ENZYME: CPT

## 2020-04-15 PROCEDURE — 83880 ASSAY OF NATRIURETIC PEPTIDE: CPT

## 2020-04-15 PROCEDURE — 82955 ASSAY OF G6PD ENZYME: CPT

## 2020-04-15 PROCEDURE — 85730 THROMBOPLASTIN TIME PARTIAL: CPT

## 2020-04-15 PROCEDURE — 85025 COMPLETE CBC W/AUTO DIFF WBC: CPT

## 2020-04-15 PROCEDURE — 83735 ASSAY OF MAGNESIUM: CPT

## 2020-04-15 PROCEDURE — 84145 PROCALCITONIN (PCT): CPT

## 2020-04-15 PROCEDURE — 83605 ASSAY OF LACTIC ACID: CPT

## 2020-04-15 PROCEDURE — 80053 COMPREHEN METABOLIC PANEL: CPT

## 2020-04-15 PROCEDURE — 86803 HEPATITIS C AB TEST: CPT

## 2020-04-15 PROCEDURE — 93005 ELECTROCARDIOGRAM TRACING: CPT

## 2020-04-15 PROCEDURE — 83036 HEMOGLOBIN GLYCOSYLATED A1C: CPT

## 2020-04-15 PROCEDURE — 99285 EMERGENCY DEPT VISIT HI MDM: CPT

## 2020-04-15 PROCEDURE — 82803 BLOOD GASES ANY COMBINATION: CPT

## 2020-04-15 PROCEDURE — 85027 COMPLETE CBC AUTOMATED: CPT

## 2020-04-15 PROCEDURE — 94640 AIRWAY INHALATION TREATMENT: CPT

## 2020-04-15 PROCEDURE — 71045 X-RAY EXAM CHEST 1 VIEW: CPT

## 2020-04-15 PROCEDURE — 82962 GLUCOSE BLOOD TEST: CPT

## 2020-04-15 PROCEDURE — 84100 ASSAY OF PHOSPHORUS: CPT

## 2020-04-15 PROCEDURE — 99238 HOSP IP/OBS DSCHRG MGMT 30/<: CPT

## 2020-04-15 PROCEDURE — 85379 FIBRIN DEGRADATION QUANT: CPT

## 2020-04-15 PROCEDURE — 36415 COLL VENOUS BLD VENIPUNCTURE: CPT

## 2020-04-15 PROCEDURE — 86480 TB TEST CELL IMMUN MEASURE: CPT

## 2020-04-15 RX ORDER — REPAGLINIDE 1 MG/1
1 TABLET ORAL
Qty: 15 | Refills: 0
Start: 2020-04-15 | End: 2020-04-19

## 2020-04-15 RX ORDER — FENOFIBRATE,MICRONIZED 130 MG
1 CAPSULE ORAL
Qty: 0 | Refills: 0 | DISCHARGE

## 2020-04-15 RX ORDER — ATORVASTATIN CALCIUM 80 MG/1
1 TABLET, FILM COATED ORAL
Qty: 0 | Refills: 0 | DISCHARGE

## 2020-04-15 RX ORDER — ERGOCALCIFEROL 1.25 MG/1
1 CAPSULE ORAL
Qty: 0 | Refills: 0 | DISCHARGE

## 2020-04-15 RX ORDER — HYDROXYCHLOROQUINE SULFATE 200 MG
2 TABLET ORAL
Qty: 4 | Refills: 0
Start: 2020-04-15 | End: 2020-04-16

## 2020-04-15 RX ORDER — INSULIN GLARGINE 100 [IU]/ML
25 INJECTION, SOLUTION SUBCUTANEOUS
Qty: 0 | Refills: 0 | DISCHARGE

## 2020-04-15 RX ORDER — ASPIRIN/CALCIUM CARB/MAGNESIUM 324 MG
1 TABLET ORAL
Qty: 5 | Refills: 0
Start: 2020-04-15 | End: 2020-04-19

## 2020-04-15 RX ORDER — SITAGLIPTIN AND METFORMIN HYDROCHLORIDE 500; 50 MG/1; MG/1
1 TABLET, FILM COATED ORAL
Qty: 10 | Refills: 0
Start: 2020-04-15 | End: 2020-04-19

## 2020-04-15 RX ORDER — ASPIRIN/CALCIUM CARB/MAGNESIUM 324 MG
1 TABLET ORAL
Qty: 0 | Refills: 0 | DISCHARGE

## 2020-04-15 RX ORDER — AZITHROMYCIN 500 MG/1
1 TABLET, FILM COATED ORAL
Qty: 2 | Refills: 0
Start: 2020-04-15 | End: 2020-04-16

## 2020-04-15 RX ORDER — INSULIN GLARGINE 100 [IU]/ML
25 INJECTION, SOLUTION SUBCUTANEOUS
Qty: 125 | Refills: 0
Start: 2020-04-15 | End: 2020-04-19

## 2020-04-15 RX ORDER — REPAGLINIDE 1 MG/1
1 TABLET ORAL
Qty: 0 | Refills: 0 | DISCHARGE

## 2020-04-15 RX ORDER — SITAGLIPTIN AND METFORMIN HYDROCHLORIDE 500; 50 MG/1; MG/1
1 TABLET, FILM COATED ORAL
Qty: 0 | Refills: 0 | DISCHARGE

## 2020-04-15 RX ORDER — ATORVASTATIN CALCIUM 80 MG/1
1 TABLET, FILM COATED ORAL
Qty: 5 | Refills: 0
Start: 2020-04-15 | End: 2020-04-19

## 2020-04-15 RX ADMIN — Medication 2: at 12:34

## 2020-04-15 RX ADMIN — FAMOTIDINE 20 MILLIGRAM(S): 10 INJECTION INTRAVENOUS at 11:02

## 2020-04-15 RX ADMIN — Medication 81 MILLIGRAM(S): at 11:03

## 2020-04-15 RX ADMIN — Medication 400 MILLIGRAM(S): at 14:08

## 2020-04-15 RX ADMIN — AZITHROMYCIN 250 MILLIGRAM(S): 500 TABLET, FILM COATED ORAL at 11:02

## 2020-04-15 RX ADMIN — Medication 1: at 08:32

## 2020-04-15 NOTE — DISCHARGE NOTE PROVIDER - NSDCMRMEDTOKEN_GEN_ALL_CORE_FT
atorvastatin 40 mg oral tablet: 1 tab(s) orally once a day  Ecotrin Adult Low Strength 81 mg oral delayed release tablet: 1 tab(s) orally once a day  enalapril 5 mg oral tablet: 1 tab(s) orally once a day  fenofibrate 160 mg oral tablet: 1 tab(s) orally once a day  Janumet 50 mg-1000 mg oral tablet: 1 tab(s) orally 2 times a day  Lantus 100 units/mL subcutaneous solution: 25 unit(s) subcutaneous once a day (at bedtime)  repaglinide 2 mg oral tablet: 1 tab(s) orally 3 times a day (before meals)  Vitamin D2 50,000 intl units (1.25 mg) oral capsule: 1 cap(s) orally once a week (Monday) Aspir 81 oral delayed release tablet: 1 tab(s) orally once a day  atorvastatin 40 mg oral tablet: 1 tab(s) orally once a day  Janumet 50 mg-1000 mg oral tablet: 1 tab(s) orally 2 times a day  Lantus 100 units/mL subcutaneous solution: 25 unit(s) subcutaneous once a day (at bedtime)  Plaquenil 200 mg oral tablet: 2 tab(s) orally once a day tomorrow on 4/16-4/17  repaglinide 2 mg oral tablet: 1 tab(s) orally 3 times a day (before meals)  Zithromax 250 mg oral tablet: 1 tab(s) orally once a day starting 4/16-4/17

## 2020-04-15 NOTE — PROGRESS NOTE ADULT - SUBJECTIVE AND OBJECTIVE BOX
**INCOMPLETE***  INTERVAL HPI/OVERNIGHT EVENTS:  Patient was seen and examined at bedside. As per nurse and patient, no o/n events, patient resting comfortably. No complaints at this time. Patient denies: fever, chills, dizziness, weakness, HA, Changes in vision, CP, palpitations, SOB, cough, N/V/D/C, dysuria, changes in bowel movements, LE edema. ROS otherwise negative.    VITAL SIGNS:  T(F): 98.5 (04-15-20 @ 06:16)  HR: 91 (04-15-20 @ 06:16)  BP: 114/73 (04-15-20 @ 06:16)  RR: 20 (04-15-20 @ 06:16)  SpO2: 92% (04-15-20 @ 06:16)  Wt(kg): --    PHYSICAL EXAM:  Constitutional: resting comfortably in bed; NAD  HEENT: NC/AT, PERRL, EOMI; MMM  Respiratory: CTA B/L; no W/R/R, no retractions. No accessory muscle use.   Cardiac: +S1/S2; RRR; no M/R/G  Gastrointestinal: soft, NT/ND; no rebound or guarding.   Back: spine midline, no bony tenderness or step-offs  Extremities: WWP, no clubbing or cyanosis; no peripheral edema  Musculoskeletal: NROM x4; no joint swelling, tenderness or erythema  Dermatologic: skin warm, dry and intact; no rashes, wounds, or scars  Neurologic: AAOx3; CNII-XII grossly intact; no focal deficits    MEDICATIONS  (STANDING):  aspirin enteric coated 81 milliGRAM(s) Oral daily  atorvastatin 40 milliGRAM(s) Oral at bedtime  azithromycin   Tablet 250 milliGRAM(s) Oral daily  dextrose 5%. 1000 milliLiter(s) (50 mL/Hr) IV Continuous <Continuous>  dextrose 50% Injectable 12.5 Gram(s) IV Push once  dextrose 50% Injectable 25 Gram(s) IV Push once  dextrose 50% Injectable 25 Gram(s) IV Push once  enoxaparin Injectable 40 milliGRAM(s) SubCutaneous every 24 hours  famotidine    Tablet 20 milliGRAM(s) Oral daily  hydroxychloroquine   Oral   hydroxychloroquine 400 milliGRAM(s) Oral every 24 hours  insulin lispro (HumaLOG) corrective regimen sliding scale   SubCutaneous Before meals and at bedtime    MEDICATIONS  (PRN):  acetaminophen   Tablet .. 650 milliGRAM(s) Oral every 6 hours PRN Temp greater or equal to 38C (100.4F), Mild Pain (1 - 3)  dextrose 40% Gel 15 Gram(s) Oral once PRN Blood Glucose LESS THAN 70 milliGRAM(s)/deciliter  glucagon  Injectable 1 milliGRAM(s) IntraMuscular once PRN Glucose LESS THAN 70 milligrams/deciliter      Allergies    No Known Allergies    Intolerances        LABS:                        11.1   7.15  )-----------( 343      ( 15 Apr 2020 06:48 )             35.0     04-15    139  |  104  |  6<L>  ----------------------------<  181<H>  4.0   |  23  |  0.50    Ca    8.2<L>      15 Apr 2020 06:48  Phos  2.9     04-15  Mg     2.0     04-15    TPro  6.8  /  Alb  3.4  /  TBili  0.4  /  DBili  x   /  AST  32  /  ALT  49<H>  /  AlkPhos  106  04-15    PT/INR - ( 13 Apr 2020 15:13 )   PT: 12.3 sec;   INR: 1.08          PTT - ( 13 Apr 2020 15:13 )  PTT:33.2 sec      RADIOLOGY & ADDITIONAL TESTS:  Reviewed INTERVAL HPI/OVERNIGHT EVENTS:  Patient was seen and examined at bedside. As per nurse and patient, no o/n events, patient resting comfortably. Pt reports feeling much better. No complaints at this time. Patient denies: fever, chills, CP, palpitations, SOB, cough, ROS otherwise negative.    VITAL SIGNS:  T(F): 98.5 (04-15-20 @ 06:16)  HR: 91 (04-15-20 @ 06:16)  BP: 114/73 (04-15-20 @ 06:16)  RR: 20 (04-15-20 @ 06:16)  SpO2: 92% (04-15-20 @ 06:16)  Wt(kg): --    PHYSICAL EXAM:  Constitutional: resting comfortably in bed; NAD  HEENT: NC/AT, PERRL, EOMI; MMM  Respiratory: CTA B/L; no W/R/R, no retractions. No accessory muscle use.   Cardiac: +S1/S2; RRR; no M/R/G  Gastrointestinal: soft, NT/ND; no rebound or guarding.   Back: spine midline, no bony tenderness or step-offs  Extremities: WWP, no clubbing or cyanosis; no peripheral edema  Musculoskeletal: NROM x4; no joint swelling, tenderness or erythema  Dermatologic: skin warm, dry and intact; no rashes, wounds, or scars  Neurologic: AAOx3; CNII-XII grossly intact; no focal deficits    MEDICATIONS  (STANDING):  aspirin enteric coated 81 milliGRAM(s) Oral daily  atorvastatin 40 milliGRAM(s) Oral at bedtime  azithromycin   Tablet 250 milliGRAM(s) Oral daily  dextrose 5%. 1000 milliLiter(s) (50 mL/Hr) IV Continuous <Continuous>  dextrose 50% Injectable 12.5 Gram(s) IV Push once  dextrose 50% Injectable 25 Gram(s) IV Push once  dextrose 50% Injectable 25 Gram(s) IV Push once  enoxaparin Injectable 40 milliGRAM(s) SubCutaneous every 24 hours  famotidine    Tablet 20 milliGRAM(s) Oral daily  hydroxychloroquine   Oral   hydroxychloroquine 400 milliGRAM(s) Oral every 24 hours  insulin lispro (HumaLOG) corrective regimen sliding scale   SubCutaneous Before meals and at bedtime    MEDICATIONS  (PRN):  acetaminophen   Tablet .. 650 milliGRAM(s) Oral every 6 hours PRN Temp greater or equal to 38C (100.4F), Mild Pain (1 - 3)  dextrose 40% Gel 15 Gram(s) Oral once PRN Blood Glucose LESS THAN 70 milliGRAM(s)/deciliter  glucagon  Injectable 1 milliGRAM(s) IntraMuscular once PRN Glucose LESS THAN 70 milligrams/deciliter      Allergies    No Known Allergies    Intolerances        LABS:                        11.1   7.15  )-----------( 343      ( 15 Apr 2020 06:48 )             35.0     04-15    139  |  104  |  6<L>  ----------------------------<  181<H>  4.0   |  23  |  0.50    Ca    8.2<L>      15 Apr 2020 06:48  Phos  2.9     04-15  Mg     2.0     04-15    TPro  6.8  /  Alb  3.4  /  TBili  0.4  /  DBili  x   /  AST  32  /  ALT  49<H>  /  AlkPhos  106  04-15    PT/INR - ( 13 Apr 2020 15:13 )   PT: 12.3 sec;   INR: 1.08          PTT - ( 13 Apr 2020 15:13 )  PTT:33.2 sec      RADIOLOGY & ADDITIONAL TESTS:  Reviewed

## 2020-04-15 NOTE — PROGRESS NOTE ADULT - PROBLEM SELECTOR PLAN 2
Pt saturating 92% on RA, 89% on ambulation, 98% on 3L NC  - CXR on 4/13: bilateral patchy opacities suspicious for COVID-19.   - Continue to wean NC as tolerated Pt saturating 94% on RA, 89-93% on ambulation RA  - CXR on 4/13: bilateral patchy opacities suspicious for COVID-19.

## 2020-04-15 NOTE — DISCHARGE NOTE NURSING/CASE MANAGEMENT/SOCIAL WORK - PATIENT PORTAL LINK FT
You can access the FollowMyHealth Patient Portal offered by [facility] by registering at the following website: [email or web address]. By joining [email or web address]’s FollowMyHealth portal, you will also be able to view your health information using other applications (apps) compatible with our system.

## 2020-04-15 NOTE — PROGRESS NOTE ADULT - PROBLEM SELECTOR PLAN 3
Pt tachycardic and febrile on admission, sepsis likely 2/2 COVID infection  - F/u blood cultures: NGTD

## 2020-04-15 NOTE — PROGRESS NOTE ADULT - PROBLEM SELECTOR PLAN 1
Pt presents to the ED on 4/13/20 with cough, SOB, fever and diarrhea x 3-4 days.   - covid-19 PCR positive 4/12  - Started Hydroxychloroquine 400mg PO BID x 2 doses, then 200mg PO BID for 4 days (4/13)  - Pt given Azithromycin 500mg PO x1 in the ED, continue azithromycin 250mg PO Daily x 4 days (4/13)   - QTc 441 on 4/13 , f/u daily ECG's   - continue Famotidine 20mg PO BID   - D-dimer <150 (166), Ferritin 412 (369), CRP 19 (16.22)  - Continue to trend inflammatory markers   - F/u blood cultures: NGTD   - Tylenol 650mg for fever   - Monitor O2 saturation, monitor for respiratory distress  - Nasal cannula as needed, avoid HFNC/BiPAP  - Isolation precautions; contact and isolation Pt presents to the ED on 4/13/20 with cough, SOB, fever and diarrhea x 3-4 days.   - covid-19 PCR positive 4/12  - Started Hydroxychloroquine 400mg PO BID x 2 doses, then 200mg PO BID for 4 days (4/13-4/17)  - Pt given Azithromycin 500mg PO x1 in the ED, continue azithromycin 250mg PO Daily x 4 days (4/13-4/17)   - QTc 441 on 4/13   - continue Famotidine 20mg PO BID   - Inflammatory markers downtrending  - Continue to trend inflammatory markers   - F/u blood cultures: NGTD   - Tylenol 650mg for fever   - Currently sating 94% on RA, 89-93% on ambulation  - Monitor O2 saturation, monitor for respiratory distress  - Nasal cannula as needed, avoid HFNC/BiPAP  - Isolation precautions; contact and isolation

## 2020-04-15 NOTE — DISCHARGE NOTE PROVIDER - CARE PROVIDER_API CALL
Maddy Oliver)  Cardiovascular Disease; Internal Medicine  [address] [place] [address]  Phone: [phone]  Fax: [phone]  Follow Up Time:

## 2020-04-15 NOTE — DISCHARGE NOTE PROVIDER - NSDCCPCAREPLAN_GEN_ALL_CORE_FT
PRINCIPAL DISCHARGE DIAGNOSIS  Diagnosis: COVID-19 virus infection  Assessment and Plan of Treatment: There was concern that you may have the novel new coronavirus, also known as COVID-19. This test came back positive from a nasal and oral (mouth) swab that was done earlier in your admission to [facility] on 4/13. On 4/13 you were started on Azithromycin 250mg daily and Plaquenil 800mg once followed by 400mg daily. You need 2 more days of this medication. Please take Azithromycin 250mg once day day for 2 days starting tomorrow (4/16-4/17) and Plaquenil 400mg meri day for 2 days startin tomorow (4/16-4/17).  Your symptoms improved dramatically during your hospital stay. Ssupportive care, which means: rest, hydration, and maintaining a good healthy diet. You may take tylenol if you experience any fevers and Robitussin for cough. If you start experiencing extreme shortness of breath, difficulty breathing resulting in the inability to even walk, please visit an urgent care. Please maintain a 2 week (14 day) quarantine in your apartment until a health care agent calls you with the test results. The department of health will followup with you via phone, please do not go to your PCP while in self quarantine. Wear a mask at all times should you have to be outdoors briefly - and avoid crowds, public spaces, and mass transit at all times during this quarantine. Continue to wash your hands frequently. Please see the supplemented written instructions for further use. PRINCIPAL DISCHARGE DIAGNOSIS  Diagnosis: COVID-19 virus infection  Assessment and Plan of Treatment: There was concern that you may have the novel new coronavirus, also known as COVID-19. This test came back positive from a nasal and oral (mouth) swab that was done earlier in your admission to [facility] on 4/13. On 4/13 you were started on Azithromycin 250mg daily and Plaquenil 800mg once followed by 400mg daily. You need 2 more days of this medication. Please take Azithromycin 250mg once day day for 2 days starting tomorrow (4/16-4/17) and Plaquenil 400mg meri day for 2 days startin tomorow (4/16-4/17).  Your symptoms improved dramatically during your hospital stay. Ssupportive care, which means: rest, hydration, and maintaining a good healthy diet. You may take tylenol if you experience any fevers and Robitussin for cough. If you start experiencing extreme shortness of breath, difficulty breathing resulting in the inability to even walk, please visit an urgent care or return to the ED. Please maintain a 2 week (14 day) quarantine in your apartment once you return back home. Wear a mask at all times should you have to be outdoors briefly - and avoid crowds, public spaces, and mass transit at all times during this quarantine. Continue to wash your hands frequently. Please see the supplemented written instructions for further use. PRINCIPAL DISCHARGE DIAGNOSIS  Diagnosis: COVID-19 virus infection  Assessment and Plan of Treatment: There was concern that you may have the novel new coronavirus, also known as COVID-19. This test came back positive from a nasal and oral (mouth) swab that was done earlier in your admission to [facility] on 4/13. On 4/13 you were started on Azithromycin 250mg daily and Plaquenil 800mg once followed by 400mg daily. You need 2 more days of this medication. Please take Azithromycin 250mg once day day for 2 days starting tomorrow (4/16-4/17) and Plaquenil 400mg meri day for 2 days startin tomorow (4/16-4/17).  Your symptoms improved dramatically during your hospital stay. Ssupportive care, which means: rest, hydration, and maintaining a good healthy diet. You may take tylenol if you experience any fevers and Robitussin for cough. If you start experiencing extreme shortness of breath, difficulty breathing resulting in the inability to even walk, please visit an urgent care or return to the ED. Please maintain a 2 week (14 day) quarantine in your apartment once you return back home. Wear a mask at all times should you have to be outdoors briefly - and avoid crowds, public spaces, and mass transit at all times during this quarantine. Continue to wash your hands frequently. Please see the supplemented written instructions for further use.        SECONDARY DISCHARGE DIAGNOSES  Diagnosis: Diabetes mellitus  Assessment and Plan of Treatment: Please continue your home diabetes medications.    Diagnosis: HLD (hyperlipidemia)  Assessment and Plan of Treatment: During your hospital stay, your home medication Atorvostatin was given to you however your Fenofibrate was not given. Please follow up with your primary care provider by calling them in 1-2 weeks regaring when to restart your Fenofibrate.    Diagnosis: Hypertension  Assessment and Plan of Treatment: During your hospital stay your blood pressure was normal and you did not require your blood pressure medication, Enalapril. Please follow up with your primary care by calling them in 1-2 weeks regarding when to restart your Enalapril.

## 2020-04-15 NOTE — DISCHARGE NOTE PROVIDER - HOSPITAL COURSE
75 yo F with PMHx of HTN, DM (on insulin), HLD who presented to the [facility] ED on 4/13/2020 with non-productive cough, fever, SOB, sating at 92% on RA found to be COVID positive. On admission: D-dimer 166, Ferritin 369, CRP 16.22, Procalcitonin 0.05, . Pt started on azithromycin and plaquenil on admission. By hospital day 2 inflammatory markers downtrended and respiratory status improved. On day of discharge pt was stable for dc to [facility], sating at 94% on RA and 89-93% on ambulation, and 2 more days of plaquenil and azithromycin.

## 2020-04-15 NOTE — PROGRESS NOTE ADULT - ASSESSMENT
75 yo F with PMHx of HTN, DM (on insulin), HLD who presented to the [facility] ED on 4/13/2020 with reports of non-productive cough, chest tightness, SOB which is worse with ambulation, myalgias and fever x 4 days; as well as watery, non bloody diarrhea x 3 days. Pt was found to be febrile to 101.8F, tachycardic mehreen 111, satting 92% on RA and 89% with ambulation, with positive COVID PCR, elevated inflammatory markers and CXR consistent with COVID -19. Pt was given Azithromycin 500mg PO x1, Tylenol and albuterol inhaler and is now admitted for further management of sepsis in the setting of COVID infection.

## 2020-04-16 LAB — G6PD RBC-CCNC: 13.5 U/G HGB — SIGNIFICANT CHANGE UP (ref 7–20.5)

## 2020-04-17 DIAGNOSIS — J96.01 ACUTE RESPIRATORY FAILURE WITH HYPOXIA: ICD-10-CM

## 2020-04-17 DIAGNOSIS — Z79.4 LONG TERM (CURRENT) USE OF INSULIN: ICD-10-CM

## 2020-04-17 DIAGNOSIS — J12.89 OTHER VIRAL PNEUMONIA: ICD-10-CM

## 2020-04-17 DIAGNOSIS — R06.02 SHORTNESS OF BREATH: ICD-10-CM

## 2020-04-17 DIAGNOSIS — Z79.82 LONG TERM (CURRENT) USE OF ASPIRIN: ICD-10-CM

## 2020-04-17 DIAGNOSIS — I10 ESSENTIAL (PRIMARY) HYPERTENSION: ICD-10-CM

## 2020-04-17 DIAGNOSIS — E78.5 HYPERLIPIDEMIA, UNSPECIFIED: ICD-10-CM

## 2020-04-17 DIAGNOSIS — E11.9 TYPE 2 DIABETES MELLITUS WITHOUT COMPLICATIONS: ICD-10-CM

## 2020-04-17 DIAGNOSIS — A41.89 OTHER SPECIFIED SEPSIS: ICD-10-CM

## 2020-04-17 DIAGNOSIS — U07.1 COVID-19: ICD-10-CM

## 2020-04-17 LAB
CULTURE RESULTS: NO GROWTH — SIGNIFICANT CHANGE UP
CULTURE RESULTS: NO GROWTH — SIGNIFICANT CHANGE UP
SPECIMEN SOURCE: SIGNIFICANT CHANGE UP
SPECIMEN SOURCE: SIGNIFICANT CHANGE UP

## 2020-07-09 NOTE — ED ADULT NURSE NOTE - PRIMARY CARE PROVIDER
Bed: 19  Expected date:   Expected time:   Means of arrival:   Comments:  Souleymane Millan RN  07/09/20 0611 non affiliated

## 2024-01-01 NOTE — ED PROVIDER NOTE - HISTORY ATTESTATION, MLM
Met with mom to discuss breastfeeding and address any concerns. Mom reports baby is feeding well, has a good latch, reports minimal pain with latch and baby has a good suck. States that baby feeds for 5-20 minute intervals and is alternating between breasts for each feed. She reports adequate stools and wet diapers. Discussed with mom that feedings should be every 3-4 hours, for a total for 8-12 feeds per day. Length of time feeding is dependent on baby and will not place restriction on how long baby wants to feed. Also discussed stool and urine output should be equivalent to day number of life and by day 6 there should be 6 wet diapers per day and stools should be brown seedy in color. Mom verbalized understanding and agreed to communicate with nurse if any problems arise prior to discharge.     Alex Andres, DO PGY-1  
I have reviewed and confirmed nurses' notes...
